# Patient Record
Sex: FEMALE | Race: ASIAN | NOT HISPANIC OR LATINO | Employment: OTHER | URBAN - METROPOLITAN AREA
[De-identification: names, ages, dates, MRNs, and addresses within clinical notes are randomized per-mention and may not be internally consistent; named-entity substitution may affect disease eponyms.]

---

## 2017-09-22 ENCOUNTER — OFFICE VISIT (OUTPATIENT)
Dept: PRIMARY CARE CLINIC | Facility: CLINIC | Age: 67
End: 2017-09-22
Payer: MEDICARE

## 2017-09-22 VITALS
WEIGHT: 152 LBS | DIASTOLIC BLOOD PRESSURE: 80 MMHG | HEIGHT: 63 IN | RESPIRATION RATE: 18 BRPM | BODY MASS INDEX: 26.93 KG/M2 | OXYGEN SATURATION: 99 % | SYSTOLIC BLOOD PRESSURE: 143 MMHG | TEMPERATURE: 98 F | HEART RATE: 52 BPM

## 2017-09-22 DIAGNOSIS — E78.5 HYPERLIPIDEMIA, UNSPECIFIED HYPERLIPIDEMIA TYPE: ICD-10-CM

## 2017-09-22 DIAGNOSIS — L30.9 ECZEMA, UNSPECIFIED TYPE: ICD-10-CM

## 2017-09-22 DIAGNOSIS — E66.3 OVERWEIGHT: ICD-10-CM

## 2017-09-22 DIAGNOSIS — H54.40 BLIND RIGHT EYE: ICD-10-CM

## 2017-09-22 DIAGNOSIS — E11.9 TYPE 2 DIABETES MELLITUS WITHOUT COMPLICATION, WITHOUT LONG-TERM CURRENT USE OF INSULIN: ICD-10-CM

## 2017-09-22 DIAGNOSIS — I10 ESSENTIAL HYPERTENSION: Primary | ICD-10-CM

## 2017-09-22 PROCEDURE — 90670 PCV13 VACCINE IM: CPT | Mod: PBBFAC,PN

## 2017-09-22 PROCEDURE — 99213 OFFICE O/P EST LOW 20 MIN: CPT | Mod: PBBFAC,PN | Performed by: FAMILY MEDICINE

## 2017-09-22 PROCEDURE — G0008 ADMIN INFLUENZA VIRUS VAC: HCPCS | Mod: PBBFAC,PN

## 2017-09-22 PROCEDURE — 99999 PR PBB SHADOW E&M-EST. PATIENT-LVL III: CPT | Mod: PBBFAC,,, | Performed by: FAMILY MEDICINE

## 2017-09-22 PROCEDURE — 99213 OFFICE O/P EST LOW 20 MIN: CPT | Mod: S$PBB,,, | Performed by: FAMILY MEDICINE

## 2017-09-22 RX ORDER — DICYCLOMINE HYDROCHLORIDE 10 MG/1
10 CAPSULE ORAL
COMMUNITY
End: 2017-09-22 | Stop reason: SDUPTHER

## 2017-09-22 RX ORDER — ATORVASTATIN CALCIUM 10 MG/1
10 TABLET, FILM COATED ORAL DAILY
Qty: 90 TABLET | Refills: 3 | Status: SHIPPED | OUTPATIENT
Start: 2017-09-22 | End: 2018-01-10

## 2017-09-22 RX ORDER — BETAMETHASONE VALERATE 1.2 MG/G
CREAM TOPICAL 2 TIMES DAILY
Qty: 60 G | Refills: 2 | Status: SHIPPED | OUTPATIENT
Start: 2017-09-22 | End: 2018-04-11

## 2017-09-22 RX ORDER — CLOPIDOGREL BISULFATE 75 MG/1
75 TABLET ORAL DAILY
COMMUNITY
End: 2018-01-10 | Stop reason: SDUPTHER

## 2017-09-22 RX ORDER — GABAPENTIN 100 MG/1
100 CAPSULE ORAL 2 TIMES DAILY
Qty: 180 CAPSULE | Refills: 3 | Status: SHIPPED | OUTPATIENT
Start: 2017-09-22 | End: 2018-01-10 | Stop reason: SDUPTHER

## 2017-09-22 RX ORDER — COLCHICINE 0.6 MG/1
1 TABLET ORAL DAILY
COMMUNITY
End: 2017-09-22 | Stop reason: SDUPTHER

## 2017-09-22 RX ORDER — DICYCLOMINE HYDROCHLORIDE 10 MG/1
10 CAPSULE ORAL
Qty: 180 CAPSULE | Refills: 3 | Status: SHIPPED | OUTPATIENT
Start: 2017-09-22 | End: 2018-07-13 | Stop reason: SDUPTHER

## 2017-09-22 RX ORDER — GABAPENTIN 100 MG/1
100 CAPSULE ORAL 3 TIMES DAILY
COMMUNITY
End: 2017-09-22 | Stop reason: SDUPTHER

## 2017-09-22 RX ORDER — METOPROLOL SUCCINATE 25 MG/1
25 TABLET, EXTENDED RELEASE ORAL DAILY
COMMUNITY
End: 2017-09-22 | Stop reason: SDUPTHER

## 2017-09-22 RX ORDER — AMLODIPINE BESYLATE 5 MG/1
5 TABLET ORAL DAILY
Qty: 90 TABLET | Refills: 3 | Status: SHIPPED | OUTPATIENT
Start: 2017-09-22 | End: 2018-01-10 | Stop reason: SDUPTHER

## 2017-09-22 RX ORDER — ASPIRIN 81 MG/1
81 TABLET ORAL DAILY
COMMUNITY
End: 2018-01-10

## 2017-09-22 RX ORDER — CITALOPRAM 20 MG/1
20 TABLET, FILM COATED ORAL DAILY
Qty: 90 TABLET | Refills: 3 | Status: SHIPPED | OUTPATIENT
Start: 2017-09-22 | End: 2018-01-10

## 2017-09-22 RX ORDER — AMLODIPINE BESYLATE 5 MG/1
5 TABLET ORAL DAILY
COMMUNITY
End: 2017-09-22 | Stop reason: SDUPTHER

## 2017-09-22 RX ORDER — METOPROLOL SUCCINATE 25 MG/1
25 TABLET, EXTENDED RELEASE ORAL DAILY
Qty: 90 TABLET | Refills: 3 | Status: SHIPPED | OUTPATIENT
Start: 2017-09-22 | End: 2018-01-10 | Stop reason: SDUPTHER

## 2017-09-22 RX ORDER — CITALOPRAM 40 MG/1
40 TABLET, FILM COATED ORAL DAILY
COMMUNITY
End: 2017-09-22

## 2017-09-22 RX ORDER — COLCHICINE 0.6 MG/1
0.6 TABLET ORAL DAILY
Qty: 90 TABLET | Refills: 3 | Status: SHIPPED | OUTPATIENT
Start: 2017-09-22 | End: 2018-01-10 | Stop reason: SDUPTHER

## 2017-09-22 RX ORDER — FLUCONAZOLE 150 MG/1
150 TABLET ORAL DAILY
Qty: 1 TABLET | Refills: 0 | Status: SHIPPED | OUTPATIENT
Start: 2017-09-22 | End: 2017-09-23

## 2017-09-22 RX ORDER — ATORVASTATIN CALCIUM 10 MG/1
10 TABLET, FILM COATED ORAL DAILY
COMMUNITY
End: 2017-09-22 | Stop reason: SDUPTHER

## 2017-09-22 NOTE — PATIENT INSTRUCTIONS
Get copies lab DR Du for computer  Flu shot and pnuemovax If desires shingles   See me 6 months   Just had mammogram last week  Sees eye doctor [place] Check feet everyday

## 2017-09-22 NOTE — PROGRESS NOTES
Subjective:       Patient ID: Arlyn Graham is a 67 y.o. female.    Chief Complaint: Annual Exam and Medication Refill    HPI: 68 yo F checkup and refills    ROS:  Skin: no psoriasis, +eczema,no  skin cancer  HEENT: No headache, ocular pain, blurred vision, diplopia, epistaxis, hoarseness change in voice, thyroid trouble  Lung: No pneumonia, asthma, Tb, wheezing, SOB,no smoking   Heart: No chest pain, ankle edema, palpitations, MI, +sam murmur,+ hypertension,+ borderline  hyperlipidemia  Abdomen: No nausea, vomiting, diarrhea, constipation, ulcers, hepatitis, gallbladder disease, melena, hematochezia, hematemesis  : no UTI, renal disease, stones  GYN neg   MS: no fractures,+ O/A right shoulder ,no  lupus, rheumatoid, gout  Neuro: No dizziness, LOC, seizures   +diabetes glu 115 , no anemia, + anxiety, + depression-- lives alone  2 children      Objective:   Physical Exam:  General: Well nourished, well developed, no acute distress + obese   Skin: No lesions  HEENT: Eyes PERRLA, EOM intact, nose patent, throat non-erythematous   NECK: Supple, no bruits, No JVD, no nodes  Lungs: Clear, no rales, rhonchi, wheezing  Heart: Regular rate and rhythm, no murmurs, gallops, or rubs  Abdomen: flat, bowel sounds positive, no tenderness, or organomegaly  MS: Range of motion and muscle strength intac tPain right shoulder with rotation  Neuro: Alert, CN intact, oriented X 3  Extremities: No cyanosis, clubbing, or edema         Assessment:       1. Essential hypertension    2. Hyperlipidemia, unspecified hyperlipidemia type    3. Type 2 diabetes mellitus without complication, without long-term current use of insulin    4. Eczema, unspecified type    5. Overweight        Plan:       Essential hypertension    Hyperlipidemia, unspecified hyperlipidemia type    Type 2 diabetes mellitus without complication, without long-term current use of insulin    Eczema, unspecified type    Overweight    Other orders  -      colchicine 0.6 mg tablet; Take 1 tablet (0.6 mg total) by mouth once daily.  Dispense: 90 tablet; Refill: 3  -     dicyclomine (BENTYL) 10 MG capsule; Take 1 capsule (10 mg total) by mouth 4 (four) times daily before meals and nightly.  Dispense: 180 capsule; Refill: 3  -     atorvastatin (LIPITOR) 10 MG tablet; Take 1 tablet (10 mg total) by mouth once daily.  Dispense: 90 tablet; Refill: 3  -     gabapentin (NEURONTIN) 100 MG capsule; Take 1 capsule (100 mg total) by mouth 2 (two) times daily.  Dispense: 180 capsule; Refill: 3  -     metoprolol succinate (TOPROL-XL) 25 MG 24 hr tablet; Take 1 tablet (25 mg total) by mouth once daily.  Dispense: 90 tablet; Refill: 3  -     amlodipine (NORVASC) 5 MG tablet; Take 1 tablet (5 mg total) by mouth once daily.  Dispense: 90 tablet; Refill: 3  -     citalopram (CELEXA) 20 MG tablet; Take 1 tablet (20 mg total) by mouth once daily.  Dispense: 90 tablet; Refill: 3  -     betamethasone valerate 0.1% (VALISONE) 0.1 % Crea; Apply topically 2 (two) times daily.  Dispense: 60 g; Refill: 2

## 2017-09-22 NOTE — PROGRESS NOTES
ID by name and , Flu vaccine 0.5ml IM given left deltoid, Pneum 13 vaccine 0.5ml IM given right deltoid w/aseptic technique, tolerated well. No adverse reaction noted.  VIS given, voiced no complaints.

## 2018-01-10 ENCOUNTER — OFFICE VISIT (OUTPATIENT)
Dept: PRIMARY CARE CLINIC | Facility: CLINIC | Age: 68
End: 2018-01-10
Payer: MEDICARE

## 2018-01-10 VITALS
BODY MASS INDEX: 28.71 KG/M2 | HEIGHT: 62 IN | DIASTOLIC BLOOD PRESSURE: 79 MMHG | HEART RATE: 48 BPM | WEIGHT: 156 LBS | OXYGEN SATURATION: 99 % | RESPIRATION RATE: 18 BRPM | SYSTOLIC BLOOD PRESSURE: 134 MMHG | TEMPERATURE: 98 F

## 2018-01-10 DIAGNOSIS — I10 ESSENTIAL HYPERTENSION: ICD-10-CM

## 2018-01-10 DIAGNOSIS — L30.9 ECZEMA, UNSPECIFIED TYPE: ICD-10-CM

## 2018-01-10 DIAGNOSIS — H54.40 BLIND RIGHT EYE: ICD-10-CM

## 2018-01-10 DIAGNOSIS — E78.5 HYPERLIPIDEMIA, UNSPECIFIED HYPERLIPIDEMIA TYPE: ICD-10-CM

## 2018-01-10 DIAGNOSIS — E66.3 OVERWEIGHT: ICD-10-CM

## 2018-01-10 DIAGNOSIS — R79.89 ELEVATED LFTS: ICD-10-CM

## 2018-01-10 DIAGNOSIS — E11.9 TYPE 2 DIABETES MELLITUS WITHOUT COMPLICATION, WITHOUT LONG-TERM CURRENT USE OF INSULIN: Primary | ICD-10-CM

## 2018-01-10 PROCEDURE — 99213 OFFICE O/P EST LOW 20 MIN: CPT | Mod: PBBFAC,PN | Performed by: FAMILY MEDICINE

## 2018-01-10 PROCEDURE — 99999 PR PBB SHADOW E&M-EST. PATIENT-LVL III: CPT | Mod: PBBFAC,,, | Performed by: FAMILY MEDICINE

## 2018-01-10 PROCEDURE — 99214 OFFICE O/P EST MOD 30 MIN: CPT | Mod: S$PBB,,, | Performed by: FAMILY MEDICINE

## 2018-01-10 RX ORDER — ATORVASTATIN CALCIUM 10 MG/1
10 TABLET, FILM COATED ORAL DAILY
Qty: 90 TABLET | Refills: 3 | Status: CANCELLED | OUTPATIENT
Start: 2018-01-10

## 2018-01-10 RX ORDER — AMLODIPINE BESYLATE 5 MG/1
5 TABLET ORAL DAILY
Qty: 90 TABLET | Refills: 3 | Status: SHIPPED | OUTPATIENT
Start: 2018-01-10 | End: 2018-07-13 | Stop reason: SDUPTHER

## 2018-01-10 RX ORDER — COLCHICINE 0.6 MG/1
0.6 TABLET ORAL DAILY
Qty: 90 TABLET | Refills: 3 | Status: SHIPPED | OUTPATIENT
Start: 2018-01-10 | End: 2018-07-13 | Stop reason: SDUPTHER

## 2018-01-10 RX ORDER — BENZONATATE 200 MG/1
200 CAPSULE ORAL 3 TIMES DAILY PRN
Qty: 90 CAPSULE | Refills: 5 | Status: SHIPPED | OUTPATIENT
Start: 2018-01-10 | End: 2019-11-04 | Stop reason: SDUPTHER

## 2018-01-10 RX ORDER — GABAPENTIN 100 MG/1
100 CAPSULE ORAL 2 TIMES DAILY
Qty: 180 CAPSULE | Refills: 3 | Status: SHIPPED | OUTPATIENT
Start: 2018-01-10 | End: 2018-07-13 | Stop reason: SDUPTHER

## 2018-01-10 RX ORDER — CLOPIDOGREL BISULFATE 75 MG/1
75 TABLET ORAL DAILY
Qty: 90 TABLET | Refills: 3 | Status: SHIPPED | OUTPATIENT
Start: 2018-01-10 | End: 2018-07-13 | Stop reason: SDUPTHER

## 2018-01-10 RX ORDER — VENLAFAXINE 37.5 MG/1
TABLET ORAL
Qty: 180 TABLET | Refills: 3 | Status: SHIPPED | OUTPATIENT
Start: 2018-01-10 | End: 2018-07-13 | Stop reason: SDUPTHER

## 2018-01-10 RX ORDER — VENLAFAXINE 37.5 MG/1
TABLET ORAL
Refills: 0 | COMMUNITY
Start: 2017-10-03 | End: 2018-01-10 | Stop reason: SDUPTHER

## 2018-01-10 RX ORDER — METOPROLOL SUCCINATE 25 MG/1
25 TABLET, EXTENDED RELEASE ORAL DAILY
Qty: 90 TABLET | Refills: 3 | Status: SHIPPED | OUTPATIENT
Start: 2018-01-10 | End: 2018-07-13 | Stop reason: SDUPTHER

## 2018-04-11 ENCOUNTER — OFFICE VISIT (OUTPATIENT)
Dept: PRIMARY CARE CLINIC | Facility: CLINIC | Age: 68
End: 2018-04-11
Payer: MEDICARE

## 2018-04-11 VITALS
HEIGHT: 60 IN | BODY MASS INDEX: 30.04 KG/M2 | SYSTOLIC BLOOD PRESSURE: 122 MMHG | OXYGEN SATURATION: 97 % | WEIGHT: 153 LBS | RESPIRATION RATE: 18 BRPM | HEART RATE: 52 BPM | DIASTOLIC BLOOD PRESSURE: 73 MMHG | TEMPERATURE: 98 F

## 2018-04-11 DIAGNOSIS — N20.0 NEPHROLITHIASIS: ICD-10-CM

## 2018-04-11 DIAGNOSIS — M10.9 GOUT, UNSPECIFIED CAUSE, UNSPECIFIED CHRONICITY, UNSPECIFIED SITE: ICD-10-CM

## 2018-04-11 DIAGNOSIS — E11.9 TYPE 2 DIABETES MELLITUS WITHOUT COMPLICATION, WITHOUT LONG-TERM CURRENT USE OF INSULIN: ICD-10-CM

## 2018-04-11 DIAGNOSIS — I10 ESSENTIAL HYPERTENSION: Primary | ICD-10-CM

## 2018-04-11 DIAGNOSIS — E78.5 HYPERLIPIDEMIA, UNSPECIFIED HYPERLIPIDEMIA TYPE: ICD-10-CM

## 2018-04-11 DIAGNOSIS — E66.3 OVERWEIGHT: ICD-10-CM

## 2018-04-11 DIAGNOSIS — L30.9 ECZEMA, UNSPECIFIED TYPE: ICD-10-CM

## 2018-04-11 DIAGNOSIS — H54.40 BLIND RIGHT EYE: ICD-10-CM

## 2018-04-11 PROCEDURE — 99213 OFFICE O/P EST LOW 20 MIN: CPT | Mod: PBBFAC,PN | Performed by: FAMILY MEDICINE

## 2018-04-11 PROCEDURE — 99999 PR PBB SHADOW E&M-EST. PATIENT-LVL III: CPT | Mod: PBBFAC,,, | Performed by: FAMILY MEDICINE

## 2018-04-11 PROCEDURE — 99213 OFFICE O/P EST LOW 20 MIN: CPT | Mod: S$PBB,,, | Performed by: FAMILY MEDICINE

## 2018-04-11 NOTE — PROGRESS NOTES
Subjective:       Patient ID: Arlyn Graham is a 67 y.o. female.    Chief Complaint: Follow-up (Check up )    HPI: 67-year-old female in for checkup.  No particular problems.  Glucose running in the 130s.  Blood pressure has been doing well.  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  + BM.  + Ambulating well .  Had EKG and stress test with Dr. Swanson.    ROS:  Skin: no psoriasis, +eczema, skin cancer  HEENT: + headache--occasional headache morning or night last about 5 minutes right parietal area severity 5 out of 10,no ocular pain, + blind in the right eye and left eye very good vision since cataract surgery, diplopia,no epistaxis, hoarseness change in voice, thyroid trouble  Lung: No pneumonia, asthma, Tb, wheezing, SOB, + no smoking  Heart: No chest pain, ankle edema, palpitations, Mi,+sam murmur, +hypertension, +hyperlipidemia  Abdomen: No nausea, vomiting, diarrhea, constipation, ulcers, hepatitis, gallbladder disease, melena, hematochezia, hematemesis history of increased liver function tests  : no UTI, renal disease, +stones  Gyn gets mammogram every year Pap smear yearly   MS: no fractures, O/A, lupus, rheumatoid, +gout  Neuro: No dizziness, LOC, seizures   No diabetes, no anemia, + occas anxiety, + occas depression   , 2 children--one  5 months pregnant pulmonary embolus, lives alone      Objective:   Physical Exam:  General: Well nourished, well developed, no acute distress slightly overweight  Skin: No lesions  HEENT: Eyes PERRLA, EOM intact, slightly irregular left pupil secondary to cataract surgery nose patent, throat non-erythematous ears TM clear  NECK: Supple, no bruits, No JVD, no nodes  Lungs: Clear, no rales, rhonchi, wheezing  Heart: Regular rate and rhythm, no murmurs, gallops, or rubs  Abdomen: flat, bowel sounds positive, no tenderness, or organomegaly  MS: Range of motion and muscle strength intact  Neuro: Alert, CN intact, oriented X 3  Extremities: No cyanosis,  clubbing, or edema         Assessment:       No diagnosis found.    Plan:       There are no diagnoses linked to this encounter.

## 2018-07-09 ENCOUNTER — CLINICAL SUPPORT (OUTPATIENT)
Dept: PRIMARY CARE CLINIC | Facility: CLINIC | Age: 68
End: 2018-07-09
Payer: MEDICARE

## 2018-07-09 DIAGNOSIS — E78.5 HYPERLIPIDEMIA, UNSPECIFIED HYPERLIPIDEMIA TYPE: ICD-10-CM

## 2018-07-09 DIAGNOSIS — M10.9 GOUT, UNSPECIFIED CAUSE, UNSPECIFIED CHRONICITY, UNSPECIFIED SITE: ICD-10-CM

## 2018-07-09 DIAGNOSIS — E11.9 TYPE 2 DIABETES MELLITUS WITHOUT COMPLICATION, WITHOUT LONG-TERM CURRENT USE OF INSULIN: ICD-10-CM

## 2018-07-09 DIAGNOSIS — I10 ESSENTIAL HYPERTENSION: ICD-10-CM

## 2018-07-09 LAB
ALBUMIN SERPL BCP-MCNC: 4 G/DL
ALP SERPL-CCNC: 124 U/L
ALT SERPL W/O P-5'-P-CCNC: 56 U/L
ANION GAP SERPL CALC-SCNC: 9 MMOL/L
AST SERPL-CCNC: 86 U/L
BASOPHILS # BLD AUTO: 0.1 K/UL
BASOPHILS NFR BLD: 2.1 %
BILIRUB SERPL-MCNC: 0.9 MG/DL
BUN SERPL-MCNC: 6 MG/DL
CALCIUM SERPL-MCNC: 9.7 MG/DL
CHLORIDE SERPL-SCNC: 106 MMOL/L
CHOLEST SERPL-MCNC: 178 MG/DL
CHOLEST/HDLC SERPL: 4 {RATIO}
CO2 SERPL-SCNC: 31 MMOL/L
CREAT SERPL-MCNC: 0.8 MG/DL
DIFFERENTIAL METHOD: ABNORMAL
EOSINOPHIL # BLD AUTO: 0.2 K/UL
EOSINOPHIL NFR BLD: 3.3 %
ERYTHROCYTE [DISTWIDTH] IN BLOOD BY AUTOMATED COUNT: 14.9 %
EST. GFR  (AFRICAN AMERICAN): >60 ML/MIN/1.73 M^2
EST. GFR  (NON AFRICAN AMERICAN): >60 ML/MIN/1.73 M^2
ESTIMATED AVG GLUCOSE: 151 MG/DL
GLUCOSE SERPL-MCNC: 133 MG/DL
HBA1C MFR BLD HPLC: 6.9 %
HCT VFR BLD AUTO: 40.7 %
HDLC SERPL-MCNC: 44 MG/DL
HDLC SERPL: 24.7 %
HGB BLD-MCNC: 13.1 G/DL
LDLC SERPL CALC-MCNC: 98 MG/DL
LYMPHOCYTES # BLD AUTO: 3.8 K/UL
LYMPHOCYTES NFR BLD: 55.4 %
MCH RBC QN AUTO: 27.1 PG
MCHC RBC AUTO-ENTMCNC: 32.3 G/DL
MCV RBC AUTO: 84 FL
MONOCYTES # BLD AUTO: 0.5 K/UL
MONOCYTES NFR BLD: 6.7 %
NEUTROPHILS # BLD AUTO: 2.2 K/UL
NEUTROPHILS NFR BLD: 32.5 %
NONHDLC SERPL-MCNC: 134 MG/DL
PLATELET # BLD AUTO: 153 K/UL
PMV BLD AUTO: 11.3 FL
POTASSIUM SERPL-SCNC: 4.4 MMOL/L
PROT SERPL-MCNC: 8.4 G/DL
RBC # BLD AUTO: 4.84 M/UL
SODIUM SERPL-SCNC: 146 MMOL/L
TRIGL SERPL-MCNC: 178 MG/DL
TSH SERPL DL<=0.005 MIU/L-ACNC: 1.77 UIU/ML
URATE SERPL-MCNC: 8.2 MG/DL
WBC # BLD AUTO: 6.8 K/UL

## 2018-07-09 PROCEDURE — 83036 HEMOGLOBIN GLYCOSYLATED A1C: CPT

## 2018-07-09 PROCEDURE — 99999 PR PBB SHADOW E&M-EST. PATIENT-LVL II: CPT | Mod: PBBFAC,,,

## 2018-07-09 PROCEDURE — 99212 OFFICE O/P EST SF 10 MIN: CPT | Mod: PBBFAC,PN

## 2018-07-13 ENCOUNTER — OFFICE VISIT (OUTPATIENT)
Dept: PRIMARY CARE CLINIC | Facility: CLINIC | Age: 68
End: 2018-07-13
Payer: MEDICARE

## 2018-07-13 VITALS
OXYGEN SATURATION: 96 % | HEART RATE: 59 BPM | DIASTOLIC BLOOD PRESSURE: 84 MMHG | TEMPERATURE: 98 F | HEIGHT: 60 IN | WEIGHT: 151 LBS | BODY MASS INDEX: 29.64 KG/M2 | SYSTOLIC BLOOD PRESSURE: 155 MMHG | RESPIRATION RATE: 18 BRPM

## 2018-07-13 DIAGNOSIS — H54.40 BLIND RIGHT EYE: ICD-10-CM

## 2018-07-13 DIAGNOSIS — I10 ESSENTIAL HYPERTENSION: ICD-10-CM

## 2018-07-13 DIAGNOSIS — L30.9 ECZEMA, UNSPECIFIED TYPE: ICD-10-CM

## 2018-07-13 DIAGNOSIS — E66.3 OVERWEIGHT: ICD-10-CM

## 2018-07-13 DIAGNOSIS — R79.89 ELEVATED LFTS: ICD-10-CM

## 2018-07-13 DIAGNOSIS — E78.5 HYPERLIPIDEMIA, UNSPECIFIED HYPERLIPIDEMIA TYPE: ICD-10-CM

## 2018-07-13 DIAGNOSIS — E79.0 HYPERURICEMIA: ICD-10-CM

## 2018-07-13 DIAGNOSIS — R74.01 NONSPECIFIC ELEVATION OF LEVELS OF TRANSAMINASE AND LACTIC ACID DEHYDROGENASE (LDH): ICD-10-CM

## 2018-07-13 DIAGNOSIS — R74.02 NONSPECIFIC ELEVATION OF LEVELS OF TRANSAMINASE AND LACTIC ACID DEHYDROGENASE (LDH): ICD-10-CM

## 2018-07-13 DIAGNOSIS — E11.9 TYPE 2 DIABETES MELLITUS WITHOUT COMPLICATION, WITHOUT LONG-TERM CURRENT USE OF INSULIN: ICD-10-CM

## 2018-07-13 DIAGNOSIS — S80.01XA CONTUSION OF RIGHT KNEE, INITIAL ENCOUNTER: ICD-10-CM

## 2018-07-13 DIAGNOSIS — S46.912A STRAIN OF LEFT SHOULDER, INITIAL ENCOUNTER: Primary | ICD-10-CM

## 2018-07-13 PROCEDURE — 99999 PR PBB SHADOW E&M-EST. PATIENT-LVL III: CPT | Mod: PBBFAC,,, | Performed by: FAMILY MEDICINE

## 2018-07-13 PROCEDURE — 99213 OFFICE O/P EST LOW 20 MIN: CPT | Mod: PBBFAC,PN,25 | Performed by: FAMILY MEDICINE

## 2018-07-13 PROCEDURE — 99213 OFFICE O/P EST LOW 20 MIN: CPT | Mod: S$PBB,,, | Performed by: FAMILY MEDICINE

## 2018-07-13 PROCEDURE — 96372 THER/PROPH/DIAG INJ SC/IM: CPT | Mod: PBBFAC,PN

## 2018-07-13 RX ORDER — VENLAFAXINE 37.5 MG/1
TABLET ORAL
Qty: 180 TABLET | Refills: 3 | Status: SHIPPED | OUTPATIENT
Start: 2018-07-13 | End: 2019-11-04 | Stop reason: SDUPTHER

## 2018-07-13 RX ORDER — BETAMETHASONE SODIUM PHOSPHATE AND BETAMETHASONE ACETATE 3; 3 MG/ML; MG/ML
12 INJECTION, SUSPENSION INTRA-ARTICULAR; INTRALESIONAL; INTRAMUSCULAR; SOFT TISSUE
Status: COMPLETED | OUTPATIENT
Start: 2018-07-13 | End: 2018-07-13

## 2018-07-13 RX ORDER — COLCHICINE 0.6 MG/1
0.6 TABLET ORAL DAILY
Qty: 90 TABLET | Refills: 3 | Status: SHIPPED | OUTPATIENT
Start: 2018-07-13 | End: 2019-06-24 | Stop reason: SDUPTHER

## 2018-07-13 RX ORDER — METOPROLOL SUCCINATE 25 MG/1
25 TABLET, EXTENDED RELEASE ORAL DAILY
Qty: 90 TABLET | Refills: 3 | Status: SHIPPED | OUTPATIENT
Start: 2018-07-13 | End: 2019-11-21 | Stop reason: SDUPTHER

## 2018-07-13 RX ORDER — LOSARTAN POTASSIUM 50 MG/1
50 TABLET ORAL DAILY
Qty: 90 TABLET | Refills: 3 | Status: SHIPPED | OUTPATIENT
Start: 2018-07-13 | End: 2019-06-24 | Stop reason: SDUPTHER

## 2018-07-13 RX ORDER — ALLOPURINOL 100 MG/1
100 TABLET ORAL DAILY
Qty: 90 TABLET | Refills: 1 | Status: SHIPPED | OUTPATIENT
Start: 2018-07-13 | End: 2018-12-15 | Stop reason: SDUPTHER

## 2018-07-13 RX ORDER — DICYCLOMINE HYDROCHLORIDE 10 MG/1
10 CAPSULE ORAL
Qty: 180 CAPSULE | Refills: 3 | Status: SHIPPED | OUTPATIENT
Start: 2018-07-13 | End: 2019-11-21 | Stop reason: SDUPTHER

## 2018-07-13 RX ORDER — ALLOPURINOL 100 MG/1
1 TABLET ORAL
COMMUNITY
Start: 2015-12-17 | End: 2018-07-13 | Stop reason: SDUPTHER

## 2018-07-13 RX ORDER — CLOPIDOGREL BISULFATE 75 MG/1
75 TABLET ORAL DAILY
Qty: 90 TABLET | Refills: 3 | Status: SHIPPED | OUTPATIENT
Start: 2018-07-13 | End: 2019-11-21 | Stop reason: SDUPTHER

## 2018-07-13 RX ORDER — COLCHICINE 0.6 MG/1
TABLET ORAL
COMMUNITY
End: 2018-07-13

## 2018-07-13 RX ORDER — AMLODIPINE BESYLATE 5 MG/1
5 TABLET ORAL DAILY
Qty: 90 TABLET | Refills: 3 | Status: SHIPPED | OUTPATIENT
Start: 2018-07-13 | End: 2019-11-21 | Stop reason: SDUPTHER

## 2018-07-13 RX ORDER — GABAPENTIN 100 MG/1
100 CAPSULE ORAL 2 TIMES DAILY
Qty: 180 CAPSULE | Refills: 3 | Status: SHIPPED | OUTPATIENT
Start: 2018-07-13 | End: 2019-11-21 | Stop reason: SDUPTHER

## 2018-07-13 RX ADMIN — BETAMETHASONE SODIUM PHOSPHATE AND BETAMETHASONE ACETATE 12 MG: 3; 3 INJECTION, SUSPENSION INTRA-ARTICULAR; INTRALESIONAL; INTRAMUSCULAR at 03:07

## 2018-07-13 NOTE — PROGRESS NOTES
PATIENT verified by name and . 12mg betamethasone given im to right vent gluteal using aseptic technique. Patient tolerated well

## 2018-07-13 NOTE — PROGRESS NOTES
Subjective:       Patient ID: Arlyn Graham is a 67 y.o. female.    Chief Complaint: Medication Refill    HPI: 67-year-old female in for medication refill..  Patient states about 2 weeks started with left shoulder pain pain with abduction and adduction.  Also has right knee pain since a fall 2 years ago in [facility]    ROS:  Skin: no psoriasis, +eczema, skin cancer  HEENT no headache,no ocular pain, + blind in the right eye and left eye very good vision since cataract surgery, diplopia,no epistaxis, hoarseness change in voice, thyroid trouble  Lung: No pneumonia, asthma, Tb, wheezing, SOB, + no smoking  Heart: No chest pain, ankle edema, palpitations, Mi,+sam murmur, +hypertension, +hyperlipidemia--patient has a wrist and arm blood pressure cuff and has been running short of high  Abdomen: No nausea, vomiting, diarrhea, constipation, ulcers, hepatitis, gallbladder disease, melena, hematochezia, hematemesis history of increased liver function tests  : no UTI, renal disease, +stones  Gyn gets mammogram every year Pap smear yearly   MS: no fractures, O/A, lupus, rheumatoid, +gout see history of present illness left shoulder  Neuro: No dizziness, LOC, seizures   No diabetes, no anemia, + occas anxiety, + occas depression   , 2 children--one  5 months pregnant pulmonary embolus, lives alone--since   4 and half years ago patient lives by herself        Objective:   Physical Exam:  General: Well nourished, well developed, no acute distress slightly overweight  Skin: No lesions  HEENT: Eyes PERRLA, EOM intact, slightly irregular left pupil secondary to cataract surgery nose patent, throat non-erythematous ears TM clear  NECK: Supple, no bruits, No JVD, no nodes  Lungs: Clear, no rales, rhonchi, wheezing  Heart: Regular rate and rhythm, no murmurs, gallops, or rubs  Abdomen: flat, bowel sounds positive, no tenderness, or organomegaly  MS:  Tenderness in the left shoulder pain with rotation  abduction abduction is full range of motion no sore--a scar on the right infrapatellar area some pain with palpation but flexion extension no crepitus able to squat arise with some soreness able ambulate without difficulty  Neuro: Alert, CN intact, oriented X 3  Extremities: No cyanosis, clubbing, or edema         Assessment:       1. Strain of left shoulder, initial encounter    2. Contusion of right knee, initial encounter    3. Blind right eye    4. Eczema, unspecified type    5. Essential hypertension    6. Hyperlipidemia, unspecified hyperlipidemia type    7. Type 2 diabetes mellitus without complication, without long-term current use of insulin    8. Overweight    9. Elevated LFTs    10. Hyperuricemia    11. Nonspecific elevation of levels of transaminase and lactic acid dehydrogenase (LDH)         Plan:        Celestone/  Medrol--then ibuprofen  Moist heat/Theragesic/range of motion exercise  Had recent labs glucose 133 hemoglobin A1c 6.9 needs to be on 1800 calorie ADA low-sodium low-fat diet  Increased liver function tests AST 86 ALT 56 needs hepatitis profile for hepatitis-A/hepatitis-B/hepatitis-C and abdominal ultrasound  Hyperuricemia once liver workup complete consider treatment with allopurinol 100 increase to 300 if needed  Triglyceride 158 treat with low-fat diet  Redo lab in 6 months CBCs CMP lipid hemoglobin A1c

## 2018-11-26 ENCOUNTER — CLINICAL SUPPORT (OUTPATIENT)
Dept: PRIMARY CARE CLINIC | Facility: CLINIC | Age: 68
End: 2018-11-26
Payer: MEDICARE

## 2018-11-26 DIAGNOSIS — Z23 NEED FOR PROPHYLACTIC VACCINATION AND INOCULATION AGAINST INFLUENZA: Primary | ICD-10-CM

## 2018-11-26 PROCEDURE — 90662 IIV NO PRSV INCREASED AG IM: CPT | Mod: PBBFAC,PN

## 2018-11-26 NOTE — PROGRESS NOTES
Patient verified by name and . Flu vaccine administered im to right deltoid using aseptic technique. Patient tolerated well

## 2018-12-17 RX ORDER — ALLOPURINOL 100 MG/1
TABLET ORAL
Qty: 90 TABLET | Refills: 0 | Status: SHIPPED | OUTPATIENT
Start: 2018-12-17 | End: 2019-02-06 | Stop reason: SDUPTHER

## 2019-01-16 RX ORDER — HYDROXYZINE HYDROCHLORIDE 25 MG/1
TABLET, FILM COATED ORAL
Qty: 90 TABLET | Refills: 0 | Status: SHIPPED | OUTPATIENT
Start: 2019-01-16 | End: 2019-11-04 | Stop reason: SDUPTHER

## 2019-01-16 NOTE — TELEPHONE ENCOUNTER
Pt with HLP and DM needs lab q 6 mo Needs CBC,CMP,lipid,HGbA1c see me 2 weeks later ok 3 mo refills give time come in get lab get seen get refills and get lab results

## 2019-02-08 RX ORDER — ALLOPURINOL 100 MG/1
TABLET ORAL
Qty: 90 TABLET | Refills: 0 | Status: SHIPPED | OUTPATIENT
Start: 2019-02-08 | End: 2019-04-22 | Stop reason: SDUPTHER

## 2019-02-08 NOTE — TELEPHONE ENCOUNTER
Call patient tell if has hyperlipidemia and diabetes needs to have lab q.6 months needs CBCs CMP lipid hemoglobin A1c should do uric acid level 1 cm OK 3 months refills give time to come in

## 2019-04-24 RX ORDER — ALLOPURINOL 100 MG/1
TABLET ORAL
Qty: 90 TABLET | Refills: 0 | Status: SHIPPED | OUTPATIENT
Start: 2019-04-24 | End: 2019-06-24 | Stop reason: SDUPTHER

## 2019-04-24 NOTE — TELEPHONE ENCOUNTER
Patient with diabetes and hyperlipidemia Needs lab q.6 months needs CBCs CMP lipid hemoglobin A1c uric acid level OK 3 months refills of allopurinol no more refills until lab done patient seen

## 2019-06-24 RX ORDER — COLCHICINE 0.6 MG/1
TABLET ORAL
Qty: 90 TABLET | Refills: 0 | Status: SHIPPED | OUTPATIENT
Start: 2019-06-24 | End: 2019-11-21 | Stop reason: SDUPTHER

## 2019-06-24 RX ORDER — ALLOPURINOL 100 MG/1
TABLET ORAL
Qty: 90 TABLET | Refills: 0 | Status: SHIPPED | OUTPATIENT
Start: 2019-06-24 | End: 2019-11-21 | Stop reason: SDUPTHER

## 2019-06-24 RX ORDER — LOSARTAN POTASSIUM 50 MG/1
TABLET ORAL
Qty: 90 TABLET | Refills: 0 | Status: SHIPPED | OUTPATIENT
Start: 2019-06-24 | End: 2019-11-21 | Stop reason: SDUPTHER

## 2019-08-26 RX ORDER — COLCHICINE 0.6 MG/1
TABLET ORAL
Qty: 90 TABLET | Refills: 0 | OUTPATIENT
Start: 2019-08-26

## 2019-08-26 RX ORDER — LOSARTAN POTASSIUM 50 MG/1
TABLET ORAL
Qty: 90 TABLET | Refills: 0 | OUTPATIENT
Start: 2019-08-26

## 2019-09-04 ENCOUNTER — PATIENT OUTREACH (OUTPATIENT)
Dept: ADMINISTRATIVE | Facility: HOSPITAL | Age: 69
End: 2019-09-04

## 2019-09-05 NOTE — PROGRESS NOTES
Patient has not been seen in over a year.   Attempted to contact patient to discuss/schedule overdue appt and HM.   Unable to leave a message on machine for return call.

## 2019-11-04 RX ORDER — COLCHICINE 0.6 MG/1
TABLET ORAL
Qty: 90 TABLET | Refills: 0 | OUTPATIENT
Start: 2019-11-04

## 2019-11-06 RX ORDER — HYDROXYZINE HYDROCHLORIDE 25 MG/1
TABLET, FILM COATED ORAL
Qty: 30 TABLET | Refills: 0 | Status: SHIPPED | OUTPATIENT
Start: 2019-11-06 | End: 2019-11-21 | Stop reason: SDUPTHER

## 2019-11-06 RX ORDER — VENLAFAXINE 37.5 MG/1
TABLET ORAL
Qty: 180 TABLET | Refills: 0 | Status: SHIPPED | OUTPATIENT
Start: 2019-11-06 | End: 2019-11-21 | Stop reason: SDUPTHER

## 2019-11-06 RX ORDER — BENZONATATE 200 MG/1
CAPSULE ORAL
Qty: 30 CAPSULE | Refills: 0 | Status: SHIPPED | OUTPATIENT
Start: 2019-11-06

## 2019-11-06 NOTE — TELEPHONE ENCOUNTER
Patient is not seen me since October 2018 OK 3 months refills of medication give time to come and get seen get additional refills no more refills without being seen should not be getting 90 day supplies of Atarax and Tessalon Perles quantity decrease patient could come in and discuss at next visit needs to be seen within the next 3 months

## 2019-11-15 ENCOUNTER — TELEPHONE (OUTPATIENT)
Dept: PRIMARY CARE CLINIC | Facility: CLINIC | Age: 69
End: 2019-11-15

## 2019-11-15 NOTE — TELEPHONE ENCOUNTER
----- Message from Mckayla Hahn sent at 11/15/2019  3:09 PM CST -----  Contact: Patient  Type:  Patient Returning Call    Who Called:  Arlyn, patient  Who Left Message for Patient:  [place]  Does the patient know what this is regarding?:  Medications  Best Call Back Number:  [phone]  Additional Information:  Missed your call, please call her back. Thanks.

## 2019-11-21 ENCOUNTER — OFFICE VISIT (OUTPATIENT)
Dept: PRIMARY CARE CLINIC | Facility: CLINIC | Age: 69
End: 2019-11-21
Payer: MEDICARE

## 2019-11-21 VITALS
BODY MASS INDEX: 29.64 KG/M2 | OXYGEN SATURATION: 98 % | HEART RATE: 56 BPM | SYSTOLIC BLOOD PRESSURE: 120 MMHG | HEIGHT: 60 IN | TEMPERATURE: 98 F | DIASTOLIC BLOOD PRESSURE: 68 MMHG | RESPIRATION RATE: 17 BRPM | WEIGHT: 151 LBS

## 2019-11-21 DIAGNOSIS — M89.9 DISORDER OF BONE, UNSPECIFIED: ICD-10-CM

## 2019-11-21 DIAGNOSIS — L30.9 ECZEMA, UNSPECIFIED TYPE: ICD-10-CM

## 2019-11-21 DIAGNOSIS — R79.89 ELEVATED LFTS: ICD-10-CM

## 2019-11-21 DIAGNOSIS — Z86.73 OLD CEREBROVASCULAR ACCIDENT (CVA) WITHOUT LATE EFFECT: ICD-10-CM

## 2019-11-21 DIAGNOSIS — E78.5 HYPERLIPIDEMIA, UNSPECIFIED HYPERLIPIDEMIA TYPE: Primary | ICD-10-CM

## 2019-11-21 DIAGNOSIS — E66.3 OVERWEIGHT: ICD-10-CM

## 2019-11-21 DIAGNOSIS — I10 ESSENTIAL HYPERTENSION: ICD-10-CM

## 2019-11-21 DIAGNOSIS — E11.9 TYPE 2 DIABETES MELLITUS WITHOUT COMPLICATION, WITHOUT LONG-TERM CURRENT USE OF INSULIN: ICD-10-CM

## 2019-11-21 DIAGNOSIS — I25.2 OLD MI (MYOCARDIAL INFARCTION): ICD-10-CM

## 2019-11-21 PROBLEM — L24.9 IRRITANT DERMATITIS: Status: ACTIVE | Noted: 2017-09-22

## 2019-11-21 PROCEDURE — 99999 PR PBB SHADOW E&M-EST. PATIENT-LVL III: ICD-10-PCS | Mod: PBBFAC,,, | Performed by: FAMILY MEDICINE

## 2019-11-21 PROCEDURE — 99999 PR PBB SHADOW E&M-EST. PATIENT-LVL III: CPT | Mod: PBBFAC,,, | Performed by: FAMILY MEDICINE

## 2019-11-21 PROCEDURE — 99214 OFFICE O/P EST MOD 30 MIN: CPT | Mod: S$PBB,ICN,, | Performed by: FAMILY MEDICINE

## 2019-11-21 PROCEDURE — 99213 OFFICE O/P EST LOW 20 MIN: CPT | Mod: PBBFAC,PN | Performed by: FAMILY MEDICINE

## 2019-11-21 PROCEDURE — 99214 PR OFFICE/OUTPT VISIT, EST, LEVL IV, 30-39 MIN: ICD-10-PCS | Mod: S$PBB,ICN,, | Performed by: FAMILY MEDICINE

## 2019-11-21 RX ORDER — VENLAFAXINE 37.5 MG/1
TABLET ORAL
Qty: 180 TABLET | Refills: 1 | Status: SHIPPED | OUTPATIENT
Start: 2019-11-21 | End: 2021-04-23 | Stop reason: CLARIF

## 2019-11-21 RX ORDER — GABAPENTIN 100 MG/1
100 CAPSULE ORAL 2 TIMES DAILY
Qty: 180 CAPSULE | Refills: 1 | Status: SHIPPED | OUTPATIENT
Start: 2019-11-21

## 2019-11-21 RX ORDER — HYDROXYZINE HYDROCHLORIDE 25 MG/1
25 TABLET, FILM COATED ORAL EVERY 8 HOURS PRN
Qty: 90 TABLET | Refills: 1 | Status: SHIPPED | OUTPATIENT
Start: 2019-11-21 | End: 2020-05-28

## 2019-11-21 RX ORDER — METOPROLOL SUCCINATE 25 MG/1
25 TABLET, EXTENDED RELEASE ORAL DAILY
Qty: 90 TABLET | Refills: 1 | Status: SHIPPED | OUTPATIENT
Start: 2019-11-21

## 2019-11-21 RX ORDER — ALLOPURINOL 100 MG/1
100 TABLET ORAL DAILY
Qty: 90 TABLET | Refills: 1 | Status: SHIPPED | OUTPATIENT
Start: 2019-11-21

## 2019-11-21 RX ORDER — COLCHICINE 0.6 MG/1
0.6 TABLET ORAL DAILY
Qty: 90 TABLET | Refills: 1 | Status: SHIPPED | OUTPATIENT
Start: 2019-11-21 | End: 2020-05-05

## 2019-11-21 RX ORDER — LOSARTAN POTASSIUM 50 MG/1
50 TABLET ORAL DAILY
Qty: 90 TABLET | Refills: 1 | Status: SHIPPED | OUTPATIENT
Start: 2019-11-21

## 2019-11-21 RX ORDER — CLOPIDOGREL BISULFATE 75 MG/1
75 TABLET ORAL DAILY
Qty: 90 TABLET | Refills: 1 | Status: ON HOLD | OUTPATIENT
Start: 2019-11-21 | End: 2021-04-28 | Stop reason: SDUPTHER

## 2019-11-21 RX ORDER — AMLODIPINE BESYLATE 5 MG/1
5 TABLET ORAL DAILY
Qty: 90 TABLET | Refills: 1 | Status: SHIPPED | OUTPATIENT
Start: 2019-11-21 | End: 2021-06-11 | Stop reason: CLARIF

## 2019-11-21 RX ORDER — METFORMIN HYDROCHLORIDE 500 MG/1
500 TABLET, FILM COATED, EXTENDED RELEASE ORAL 2 TIMES DAILY WITH MEALS
COMMUNITY
End: 2019-11-21 | Stop reason: SDUPTHER

## 2019-11-21 RX ORDER — METFORMIN HYDROCHLORIDE 500 MG/1
500 TABLET, FILM COATED, EXTENDED RELEASE ORAL 2 TIMES DAILY WITH MEALS
Qty: 180 TABLET | Refills: 1 | Status: SHIPPED | OUTPATIENT
Start: 2019-11-21

## 2019-11-21 RX ORDER — BETAMETHASONE VALERATE 1.2 MG/G
CREAM TOPICAL 2 TIMES DAILY
Qty: 60 G | Refills: 2 | Status: SHIPPED | OUTPATIENT
Start: 2019-11-21

## 2019-11-21 RX ORDER — DICYCLOMINE HYDROCHLORIDE 10 MG/1
10 CAPSULE ORAL
Qty: 180 CAPSULE | Refills: 1 | Status: SHIPPED | OUTPATIENT
Start: 2019-11-21

## 2019-11-21 NOTE — PROGRESS NOTES
Subjective:       Patient ID: Arlyn Graham is a 69 y.o. female.    Chief Complaint: Medication Refill    HPI: 70 yo F in for annual physical -- Needs refills-- 1800 calorie ADA low-sodium low purine diet--eating well BM, +BM, +ambulation.  Diabetes--glucose fair, BP glucose at home 110/89 here today 120/68.  Patient on Plavix due to CVA and mild MI--in the late s and early       Skin lesion right hand approximately size of a 50 cent piece--had an insect bite--scratch in seem to spread -patient put a Band-Aid on it and now the rash got much worse--had use Neosporin.    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  Lung: No pneumonia, asthma, Tb, wheezing, SOB,  no smoking   Heart: No chest pain, ankle edema, palpitations, + old Mi,+sam murmur, +CAD with stent +hypertension,+hyperlipidemia--patient has a wrist and arm blood pressure cuff -patient sees cardiologist every 4 months  Abdomen: No nausea, vomiting, diarrhea, constipation, ulcers, hepatitis, gallbladder disease, melena, hematochezia, hematemesis   : no UTI, renal disease, +stones--early  but passed spontaneously   Gyn gets mammogram every year Pap smear yearly no problems  MS: no fractures, O/A, lupus, rheumatoid Hx gout 1 month ago--right 1st MTP and right ankle  Neuro: No dizziness, LOC, seizures   + borderline diabetes, no anemia, + occas anxiety, + occas depression   , 2 children --older child was a daughter  aneurysm heart attack water been 19, lives alone--since   4 and half years ago patient lives by herself        Objective:   Physical Exam:  General: Well nourished, well developed, no acute distress slightly overweight  Skin: No lesions  HEENT: Eyes PERRLA, EOM intact,  slightly irregular left pupil secondary to cataract surgery nose patent, throat non-erythematous ears TM clear  NECK: Supple, no bruits, No JVD, no nodes  Lungs: Clear, no rales, rhonchi, wheezing  Heart: Regular rate and rhythm, no murmurs, gallops, or rubs  Abdomen: flat, bowel sounds positive, no tenderness, or organomegaly  MS:  Range of motion muscle strength basically within normal limits some arthritis but mild knees hands  Neuro: Alert, CN intact, oriented X 3  Extremities: No cyanosis, clubbing, or edema check feet pulses 2/4        Assessment:       1. Hyperlipidemia, unspecified hyperlipidemia type    2. Essential hypertension    3. Type 2 diabetes mellitus without complication, without long-term current use of insulin    4. Overweight    5. Old MI (myocardial infarction)    6. Old cerebrovascular accident (CVA) without late effect    7. Elevated LFTs    8. Eczema, unspecified type    9. Disorder of bone, unspecified         Plan:          Patient with irritant dermatitis dorsum of the right hand--had insect bite--applied Neosporin and a Band-Aid--area of irritation is the size of the pad of the Band-Aid--irritant dermatitis secondary to occluded Neosporin should respond well to Valisone can a add steroids p.o. if needed  1800 calorie ADA low-sodium low-fat diet  Routine lab CBCs CMP lipids hemoglobin A1c T4 TSH stool guaiac UA chest x-ray EKG is physical  Needs lab q.6 months CBCs CMP lipid hemoglobin A1c  Patient sees Dr. Swanson Q 4 months due to hypertension/hyperlipidemia/CAD with stents/told MI/old CVA  Health maintenance ft exam-done today needs to be on a statin which she is needs bone density-ordered tetanus flu lipids  Due to diabetes Needs eyes checked yearly already had her eye exam this year

## 2020-02-18 PROBLEM — I48.0 PAROXYSMAL ATRIAL FIBRILLATION: Status: ACTIVE | Noted: 2020-02-18

## 2020-05-01 ENCOUNTER — CLINICAL SUPPORT (OUTPATIENT)
Dept: PRIMARY CARE CLINIC | Facility: CLINIC | Age: 70
End: 2020-05-01
Payer: MEDICARE

## 2020-05-01 DIAGNOSIS — E66.3 OVERWEIGHT: ICD-10-CM

## 2020-05-01 DIAGNOSIS — E78.5 HYPERLIPIDEMIA, UNSPECIFIED HYPERLIPIDEMIA TYPE: ICD-10-CM

## 2020-05-01 DIAGNOSIS — I10 ESSENTIAL HYPERTENSION: ICD-10-CM

## 2020-05-01 DIAGNOSIS — E11.9 TYPE 2 DIABETES MELLITUS WITHOUT COMPLICATION, WITHOUT LONG-TERM CURRENT USE OF INSULIN: ICD-10-CM

## 2020-05-01 DIAGNOSIS — R79.89 ELEVATED LFTS: ICD-10-CM

## 2020-05-01 DIAGNOSIS — Z86.73 OLD CEREBROVASCULAR ACCIDENT (CVA) WITHOUT LATE EFFECT: ICD-10-CM

## 2020-05-01 DIAGNOSIS — I25.2 OLD MI (MYOCARDIAL INFARCTION): ICD-10-CM

## 2020-05-01 DIAGNOSIS — L30.9 ECZEMA, UNSPECIFIED TYPE: ICD-10-CM

## 2020-05-01 DIAGNOSIS — M89.9 DISORDER OF BONE, UNSPECIFIED: ICD-10-CM

## 2020-05-01 LAB
25(OH)D3+25(OH)D2 SERPL-MCNC: 25 NG/ML (ref 30–96)
ALBUMIN SERPL BCP-MCNC: 4.5 G/DL (ref 3.5–5.2)
ALP SERPL-CCNC: 81 U/L (ref 38–126)
ALT SERPL W/O P-5'-P-CCNC: 42 U/L (ref 14–54)
ANION GAP SERPL CALC-SCNC: 12 MMOL/L (ref 8–16)
AST SERPL-CCNC: 47 U/L (ref 15–41)
BASOPHILS NFR BLD: 0 % (ref 0–1.9)
BILIRUB SERPL-MCNC: 0.7 MG/DL (ref 0.3–1.2)
BUN SERPL-MCNC: 13 MG/DL (ref 8–23)
CALCIUM SERPL-MCNC: 10.3 MG/DL (ref 8.6–10)
CHLORIDE SERPL-SCNC: 104 MMOL/L (ref 101–111)
CHOLEST SERPL-MCNC: 126 MG/DL (ref 80–200)
CHOLEST/HDLC SERPL: 2.9 {RATIO} (ref 2–5)
CO2 SERPL-SCNC: 28 MMOL/L (ref 23–29)
CREAT SERPL-MCNC: 0.9 MG/DL (ref 0.5–1.4)
DIFFERENTIAL METHOD: ABNORMAL
EOSINOPHIL NFR BLD: 4 % (ref 0–8)
ERYTHROCYTE [DISTWIDTH] IN BLOOD BY AUTOMATED COUNT: 16.5 % (ref 11.5–14.5)
EST. GFR  (AFRICAN AMERICAN): >60 ML/MIN/1.73 M^2
EST. GFR  (NON AFRICAN AMERICAN): >60 ML/MIN/1.73 M^2
ESTIMATED AVG GLUCOSE: 143 MG/DL (ref 68–131)
GLUCOSE SERPL-MCNC: 115 MG/DL (ref 74–118)
HBA1C MFR BLD HPLC: 6.6 % (ref 4–5.6)
HCT VFR BLD AUTO: 39.4 % (ref 37–48.5)
HDLC SERPL-MCNC: 43 MG/DL (ref 40–75)
HDLC SERPL: 34.1 % (ref 20–50)
HGB BLD-MCNC: 12.8 G/DL (ref 12–16)
LDLC SERPL CALC-MCNC: 48 MG/DL
LYMPHOCYTES NFR BLD: 54 % (ref 18–48)
MCH RBC QN AUTO: 27.3 PG (ref 27–31)
MCHC RBC AUTO-ENTMCNC: 32.6 G/DL (ref 32–36)
MCV RBC AUTO: 84 FL (ref 82–98)
METAMYELOCYTES NFR BLD MANUAL: 1 %
MONOCYTES NFR BLD: 7 % (ref 4–15)
NEUTROPHILS NFR BLD: 30 % (ref 38–73)
NEUTS BAND NFR BLD MANUAL: 4 %
NONHDLC SERPL-MCNC: 83 MG/DL
PLATELET # BLD AUTO: 127 K/UL (ref 150–350)
PMV BLD AUTO: 10.3 FL (ref 9.2–12.9)
POTASSIUM SERPL-SCNC: 4.9 MMOL/L (ref 3.5–5.1)
PROT SERPL-MCNC: 8.6 G/DL (ref 6–8.4)
RBC # BLD AUTO: 4.69 M/UL (ref 4–5.4)
SODIUM SERPL-SCNC: 144 MMOL/L (ref 136–145)
T4 FREE SERPL-MCNC: 0.66 NG/DL (ref 0.61–1.12)
TRIGL SERPL-MCNC: 174 MG/DL (ref 30–150)
TSH SERPL DL<=0.005 MIU/L-ACNC: 2.83 UIU/ML (ref 0.45–5.33)
WBC # BLD AUTO: 6.7 K/UL (ref 3.9–12.7)

## 2020-05-01 PROCEDURE — 80061 LIPID PANEL: CPT

## 2020-05-01 PROCEDURE — 85007 BL SMEAR W/DIFF WBC COUNT: CPT

## 2020-05-01 PROCEDURE — 84439 ASSAY OF FREE THYROXINE: CPT

## 2020-05-01 PROCEDURE — 82306 VITAMIN D 25 HYDROXY: CPT

## 2020-05-01 PROCEDURE — 84443 ASSAY THYROID STIM HORMONE: CPT

## 2020-05-01 PROCEDURE — 36415 COLL VENOUS BLD VENIPUNCTURE: CPT | Mod: PBBFAC,PN

## 2020-05-01 PROCEDURE — 80053 COMPREHEN METABOLIC PANEL: CPT

## 2020-05-01 PROCEDURE — 85027 COMPLETE CBC AUTOMATED: CPT

## 2020-05-01 PROCEDURE — 83036 HEMOGLOBIN GLYCOSYLATED A1C: CPT

## 2020-05-05 RX ORDER — COLCHICINE 0.6 MG/1
TABLET ORAL
Qty: 90 TABLET | Refills: 1 | Status: SHIPPED | OUTPATIENT
Start: 2020-05-05 | End: 2020-10-05

## 2020-05-06 PROBLEM — E55.9 VITAMIN D DEFICIENCY: Status: ACTIVE | Noted: 2020-05-06

## 2020-05-06 NOTE — TELEPHONE ENCOUNTER
----- Message from Dante Hernandez MD sent at 5/6/2020 12:36 AM CDT -----  Call tell HGbA1C 6.6 excellent Vit D low 25 needs be on Vit D 50,000 unit once week #12 3 refills Slight decrease platelets 127,000 no tx Slight increase LFT 47 better was higher Lipid 174 better if 150no new tx Redfo lab in 6 mo CBC,CMP,lipid HgbA1C Vit D

## 2020-05-08 RX ORDER — ERGOCALCIFEROL 1.25 MG/1
50000 CAPSULE ORAL
Qty: 12 CAPSULE | Refills: 3 | Status: SHIPPED | OUTPATIENT
Start: 2020-05-08 | End: 2021-03-25

## 2020-05-28 RX ORDER — HYDROXYZINE HYDROCHLORIDE 25 MG/1
TABLET, FILM COATED ORAL
Qty: 90 TABLET | Refills: 1 | Status: SHIPPED | OUTPATIENT
Start: 2020-05-28 | End: 2020-10-30

## 2020-12-13 RX ORDER — HYDROXYZINE HYDROCHLORIDE 25 MG/1
TABLET, FILM COATED ORAL
Qty: 90 TABLET | Refills: 1 | Status: SHIPPED | OUTPATIENT
Start: 2020-12-13

## 2020-12-14 NOTE — TELEPHONE ENCOUNTER
Call tell pt last lab May needs lab q 6 mo ila with DM andf HLP needs CBC CMP,lipid T4 TSH HgbA1C --last seen Oct 2019 OK 3 mo refills give time get lab get seen gert additional refills no more refills withoutlab and being seen

## 2021-02-25 ENCOUNTER — IMMUNIZATION (OUTPATIENT)
Dept: PRIMARY CARE CLINIC | Facility: CLINIC | Age: 71
End: 2021-02-25
Payer: MEDICARE

## 2021-02-25 DIAGNOSIS — Z23 NEED FOR VACCINATION: Primary | ICD-10-CM

## 2021-02-25 PROCEDURE — 0011A COVID-19, MRNA, LNP-S, PF, 100 MCG/0.5 ML DOSE VACCINE: CPT | Mod: PBBFAC | Performed by: EMERGENCY MEDICINE

## 2021-03-12 RX ORDER — COLCHICINE 0.6 MG/1
TABLET ORAL
Qty: 90 TABLET | Refills: 1 | Status: SHIPPED | OUTPATIENT
Start: 2021-03-12 | End: 2021-08-28

## 2021-03-25 ENCOUNTER — TELEPHONE (OUTPATIENT)
Dept: PRIMARY CARE CLINIC | Facility: CLINIC | Age: 71
End: 2021-03-25

## 2021-03-25 ENCOUNTER — IMMUNIZATION (OUTPATIENT)
Dept: PRIMARY CARE CLINIC | Facility: CLINIC | Age: 71
End: 2021-03-25
Payer: MEDICARE

## 2021-03-25 DIAGNOSIS — Z23 NEED FOR VACCINATION: Primary | ICD-10-CM

## 2021-03-25 PROCEDURE — 91301 COVID-19, MRNA, LNP-S, PF, 100 MCG/0.5 ML DOSE VACCINE: CPT | Mod: ,,, | Performed by: FAMILY MEDICINE

## 2021-03-25 PROCEDURE — 91301 COVID-19, MRNA, LNP-S, PF, 100 MCG/0.5 ML DOSE VACCINE: ICD-10-PCS | Mod: ,,, | Performed by: FAMILY MEDICINE

## 2021-03-25 PROCEDURE — 0012A COVID-19, MRNA, LNP-S, PF, 100 MCG/0.5 ML DOSE VACCINE: CPT | Mod: CV19,,, | Performed by: FAMILY MEDICINE

## 2021-03-25 PROCEDURE — 0012A COVID-19, MRNA, LNP-S, PF, 100 MCG/0.5 ML DOSE VACCINE: ICD-10-PCS | Mod: CV19,,, | Performed by: FAMILY MEDICINE

## 2021-03-25 RX ORDER — ERGOCALCIFEROL 1.25 MG/1
CAPSULE ORAL
Qty: 12 CAPSULE | Refills: 0 | Status: SHIPPED | OUTPATIENT
Start: 2021-03-25

## 2021-03-31 ENCOUNTER — TELEPHONE (OUTPATIENT)
Dept: ADMINISTRATIVE | Facility: HOSPITAL | Age: 71
End: 2021-03-31

## 2021-04-27 PROBLEM — I49.5 SICK SINUS SYNDROME: Status: ACTIVE | Noted: 2021-04-27

## 2021-04-28 PROBLEM — Z95.0 STATUS POST PLACEMENT OF OTHER CARDIAC PACEMAKER: Status: ACTIVE | Noted: 2021-04-28

## 2021-06-15 PROBLEM — B99.8: Status: ACTIVE | Noted: 2021-06-15

## 2021-08-11 RX ORDER — COLCHICINE 0.6 MG/1
TABLET ORAL
Qty: 90 TABLET | Refills: 3 | OUTPATIENT
Start: 2021-08-11

## 2021-08-28 RX ORDER — COLCHICINE 0.6 MG/1
TABLET ORAL
Qty: 30 TABLET | Refills: 0 | Status: SHIPPED | OUTPATIENT
Start: 2021-08-28 | End: 2021-09-19

## 2021-09-19 RX ORDER — COLCHICINE 0.6 MG/1
TABLET ORAL
Qty: 30 TABLET | Refills: 2 | Status: SHIPPED | OUTPATIENT
Start: 2021-09-19 | End: 2021-10-04

## 2021-11-16 ENCOUNTER — PATIENT OUTREACH (OUTPATIENT)
Dept: ADMINISTRATIVE | Facility: HOSPITAL | Age: 71
End: 2021-11-16
Payer: MEDICARE

## 2021-11-18 ENCOUNTER — PATIENT OUTREACH (OUTPATIENT)
Dept: ADMINISTRATIVE | Facility: HOSPITAL | Age: 71
End: 2021-11-18
Payer: MEDICARE

## 2022-07-19 RX ORDER — COLCHICINE 0.6 MG/1
TABLET ORAL
Qty: 30 TABLET | Refills: 11 | OUTPATIENT
Start: 2022-07-19

## 2022-07-20 NOTE — TELEPHONE ENCOUNTER
Call tell pt not seen since Nov 2019 Should be seen yearly --Pierreys denied pt should come in fasting gt seen get refills andlab if needed

## 2022-08-08 NOTE — PROGRESS NOTES
Subjective:       Patient ID: Arlyn Graham is a 67 y.o. female.    Chief Complaint: Follow-up and Medication Refill    HPI: A 7-year-old female in for checkup med refills and lab results.  Was 147--150 normal, glucose 144 better of 100 120  ALT 9 globin A1c 6.0 which is excellent triglyceride 167 better of 150 HDL 37 better if 40 needs to exercise    ROS:  Skin: no psoriasis,+ eczema, no skin cancer   HEENT: No headache, ocular pain, blurred vision, diplopia, epistaxis, hoarseness change in voice, thyroid trouble + blind right eye  Lung: No pneumonia, asthma, Tb, wheezing, SOB, no smoke  Heart: No chest pain, ankle edema, palpitations, MI, +heart murmur, +hypertension,+ hyperlipidemia  Abdomen: No nausea, vomiting, diarrhea, constipation, ulcers, hepatitis, gallbladder disease, melena, hematochezia, hematemesis  : no UTI, renal disease, stones  MS: no fractures, O/A, lupus, rheumatoid,+  gout  Neuro: No dizziness, LOC, seizures   + diabetes glucose 144 hemoglobin A1c 6, no anemia, no anxiety, no depression   , 2 children one  heart vascular dz -birth defect, lives alone has a lonely and depressed    Objective:   Physical Exam:  General: Well nourished, well developed, no acute distress  Skin: No lesions  HEENT: Eyes PERRLA, EOM intact, nose patent, throat non-erythematous   NECK: Supple, no bruits, No JVD, no nodes  Lungs: Clear, no rales, rhonchi, wheezing  Heart: Regular rate and rhythm, no murmurs, gallops, or rubs  Abdomen: flat, bowel sounds positive, no tenderness, or organomegaly  MS: Range of motion and muscle strength intact  Neuro: Alert, CN intact, oriented X 3  Extremities: No cyanosis, clubbing, or edema         Assessment:       1. Type 2 diabetes mellitus without complication, without long-term current use of insulin    2. Hyperlipidemia, unspecified hyperlipidemia type    3. Elevated LFTs    4. Eczema, unspecified type    5. Blind right eye    6. Essential hypertension     7. Overweight        Plan:       Type 2 diabetes mellitus without complication, without long-term current use of insulin  -     Hemoglobin A1c; Future; Expected date: 07/10/2018  -     Ambulatory Referral to Nutrition Services    Hyperlipidemia, unspecified hyperlipidemia type  -     CBC auto differential; Future; Expected date: 07/10/2018  -     Comprehensive metabolic panel; Future; Expected date: 07/10/2018  -     Lipid panel; Future; Expected date: 07/10/2018    Elevated LFTs  -     Comprehensive metabolic panel; Future; Expected date: 04/10/2018    Eczema, unspecified type    Blind right eye    Essential hypertension    Overweight    Other orders  -     amLODIPine (NORVASC) 5 MG tablet; Take 1 tablet (5 mg total) by mouth once daily.  Dispense: 90 tablet; Refill: 3  -     Cancel: atorvastatin (LIPITOR) 10 MG tablet; Take 1 tablet (10 mg total) by mouth once daily.  Dispense: 90 tablet; Refill: 3  -     clopidogrel (PLAVIX) 75 mg tablet; Take 1 tablet (75 mg total) by mouth once daily.  Dispense: 90 tablet; Refill: 3  -     colchicine 0.6 mg tablet; Take 1 tablet (0.6 mg total) by mouth once daily.  Dispense: 90 tablet; Refill: 3  -     gabapentin (NEURONTIN) 100 MG capsule; Take 1 capsule (100 mg total) by mouth 2 (two) times daily.  Dispense: 180 capsule; Refill: 3  -     metoprolol succinate (TOPROL-XL) 25 MG 24 hr tablet; Take 1 tablet (25 mg total) by mouth once daily.  Dispense: 90 tablet; Refill: 3  -     rivaroxaban (XARELTO) 20 mg Tab; Take 1 tablet (20 mg total) by mouth daily with dinner or evening meal.  Dispense: 90 tablet; Refill: 3  -     venlafaxine (EFFEXOR) 37.5 MG Tab; TK 1 T PO BID  Dispense: 180 tablet; Refill: 3  -     benzonatate (TESSALON) 200 MG capsule; Take 1 capsule (200 mg total) by mouth 3 (three) times daily as needed for Cough.  Dispense: 90 capsule; Refill: 5      due to increased liver function tests DC Lipitor reduce CMP in 3 months redo all lab in 6 months a BC CMP lipid  hemoglobin A1c due thyroid only yearly  Patient somewhat depressed told to go to counseling age and  Refill and Ivone Herrera  See Daya manning is 1800-calorie ADA low-sodium low-fat diet--needs glucometer/glucose strips/lancets do daily Accu-Cheks continue metformin redo labs 6 months   Armin Bowman(Attending)

## 2023-09-18 ENCOUNTER — PATIENT MESSAGE (OUTPATIENT)
Dept: PRIMARY CARE CLINIC | Facility: CLINIC | Age: 73
End: 2023-09-18
Payer: MEDICARE

## 2023-10-18 ENCOUNTER — PATIENT MESSAGE (OUTPATIENT)
Dept: CARDIOLOGY | Facility: CLINIC | Age: 73
End: 2023-10-18
Payer: MEDICARE